# Patient Record
Sex: FEMALE | Race: WHITE | NOT HISPANIC OR LATINO | URBAN - METROPOLITAN AREA
[De-identification: names, ages, dates, MRNs, and addresses within clinical notes are randomized per-mention and may not be internally consistent; named-entity substitution may affect disease eponyms.]

---

## 2017-08-21 ENCOUNTER — OFFICE (OUTPATIENT)
Dept: URBAN - METROPOLITAN AREA CLINIC 11 | Facility: CLINIC | Age: 72
End: 2017-08-21

## 2017-08-21 ENCOUNTER — AMBULATORY SURGICAL CENTER (OUTPATIENT)
Dept: URBAN - METROPOLITAN AREA SURGERY 3 | Facility: SURGERY | Age: 72
End: 2017-08-21
Payer: MEDICARE

## 2017-08-21 ENCOUNTER — AMBULATORY SURGICAL CENTER (OUTPATIENT)
Dept: URBAN - METROPOLITAN AREA SURGERY 3 | Facility: SURGERY | Age: 72
End: 2017-08-21

## 2017-08-21 VITALS
HEART RATE: 58 BPM | DIASTOLIC BLOOD PRESSURE: 56 MMHG | RESPIRATION RATE: 18 BRPM | SYSTOLIC BLOOD PRESSURE: 147 MMHG | DIASTOLIC BLOOD PRESSURE: 59 MMHG | RESPIRATION RATE: 16 BRPM | OXYGEN SATURATION: 97 % | HEART RATE: 55 BPM | RESPIRATION RATE: 16 BRPM | OXYGEN SATURATION: 95 % | OXYGEN SATURATION: 94 % | DIASTOLIC BLOOD PRESSURE: 67 MMHG | TEMPERATURE: 97.8 F | SYSTOLIC BLOOD PRESSURE: 108 MMHG | WEIGHT: 182 LBS | HEART RATE: 56 BPM | WEIGHT: 182 LBS | DIASTOLIC BLOOD PRESSURE: 56 MMHG | SYSTOLIC BLOOD PRESSURE: 105 MMHG | TEMPERATURE: 96.4 F | OXYGEN SATURATION: 97 % | SYSTOLIC BLOOD PRESSURE: 100 MMHG | DIASTOLIC BLOOD PRESSURE: 67 MMHG | DIASTOLIC BLOOD PRESSURE: 60 MMHG | OXYGEN SATURATION: 96 % | SYSTOLIC BLOOD PRESSURE: 103 MMHG | RESPIRATION RATE: 18 BRPM | HEART RATE: 56 BPM | TEMPERATURE: 97.8 F | DIASTOLIC BLOOD PRESSURE: 58 MMHG | DIASTOLIC BLOOD PRESSURE: 59 MMHG | SYSTOLIC BLOOD PRESSURE: 108 MMHG | SYSTOLIC BLOOD PRESSURE: 100 MMHG | DIASTOLIC BLOOD PRESSURE: 60 MMHG | HEART RATE: 62 BPM | RESPIRATION RATE: 20 BRPM | SYSTOLIC BLOOD PRESSURE: 103 MMHG | HEART RATE: 62 BPM | SYSTOLIC BLOOD PRESSURE: 147 MMHG | TEMPERATURE: 96.4 F | OXYGEN SATURATION: 95 % | RESPIRATION RATE: 20 BRPM | HEART RATE: 55 BPM | HEIGHT: 63 IN | SYSTOLIC BLOOD PRESSURE: 105 MMHG | DIASTOLIC BLOOD PRESSURE: 58 MMHG | HEIGHT: 63 IN | OXYGEN SATURATION: 96 % | OXYGEN SATURATION: 94 % | HEART RATE: 58 BPM

## 2017-08-21 DIAGNOSIS — D12.3 BENIGN NEOPLASM OF TRANSVERSE COLON: ICD-10-CM

## 2017-08-21 DIAGNOSIS — K63.5 POLYP OF COLON: ICD-10-CM

## 2017-08-21 DIAGNOSIS — K64.8 OTHER HEMORRHOIDS: ICD-10-CM

## 2017-08-21 DIAGNOSIS — Z86.010 PERSONAL HISTORY OF COLONIC POLYPS: ICD-10-CM

## 2017-08-21 DIAGNOSIS — K57.30 DIVERTICULOSIS OF LARGE INTESTINE WITHOUT PERFORATION OR ABS: ICD-10-CM

## 2017-08-21 DIAGNOSIS — D12.0 BENIGN NEOPLASM OF CECUM: ICD-10-CM

## 2017-08-21 PROBLEM — D12.4 BENIGN NEOPLASM OF DESCENDING COLON: Status: ACTIVE | Noted: 2017-08-21

## 2017-08-21 PROCEDURE — 45385 COLONOSCOPY W/LESION REMOVAL: CPT | Mod: PT | Performed by: INTERNAL MEDICINE

## 2017-08-21 PROCEDURE — G8907 PT DOC NO EVENTS ON DISCHARG: HCPCS | Performed by: INTERNAL MEDICINE

## 2017-08-21 PROCEDURE — G8918 PT W/O PREOP ORDER IV AB PRO: HCPCS | Performed by: INTERNAL MEDICINE

## 2017-08-21 PROCEDURE — 88305 TISSUE EXAM BY PATHOLOGIST: CPT | Performed by: INTERNAL MEDICINE

## 2020-10-13 ENCOUNTER — AMBULATORY SURGICAL CENTER (OUTPATIENT)
Dept: URBAN - METROPOLITAN AREA SURGERY 3 | Facility: SURGERY | Age: 75
End: 2020-10-13
Payer: MEDICARE

## 2020-10-13 ENCOUNTER — OFFICE (OUTPATIENT)
Dept: URBAN - METROPOLITAN AREA PATHOLOGY 22 | Facility: PATHOLOGY | Age: 75
End: 2020-10-13
Payer: OTHER GOVERNMENT

## 2020-10-13 ENCOUNTER — AMBULATORY SURGICAL CENTER (OUTPATIENT)
Dept: URBAN - METROPOLITAN AREA SURGERY 3 | Facility: SURGERY | Age: 75
End: 2020-10-13
Payer: OTHER GOVERNMENT

## 2020-10-13 VITALS
SYSTOLIC BLOOD PRESSURE: 147 MMHG | SYSTOLIC BLOOD PRESSURE: 104 MMHG | TEMPERATURE: 98.3 F | DIASTOLIC BLOOD PRESSURE: 59 MMHG | HEART RATE: 76 BPM | OXYGEN SATURATION: 96 % | SYSTOLIC BLOOD PRESSURE: 103 MMHG | DIASTOLIC BLOOD PRESSURE: 52 MMHG | DIASTOLIC BLOOD PRESSURE: 59 MMHG | HEART RATE: 72 BPM | HEART RATE: 57 BPM | SYSTOLIC BLOOD PRESSURE: 103 MMHG | WEIGHT: 183 LBS | HEIGHT: 62 IN | SYSTOLIC BLOOD PRESSURE: 103 MMHG | RESPIRATION RATE: 20 BRPM | SYSTOLIC BLOOD PRESSURE: 147 MMHG | RESPIRATION RATE: 18 BRPM | DIASTOLIC BLOOD PRESSURE: 51 MMHG | OXYGEN SATURATION: 97 % | SYSTOLIC BLOOD PRESSURE: 112 MMHG | TEMPERATURE: 98 F | HEIGHT: 62 IN | SYSTOLIC BLOOD PRESSURE: 147 MMHG | RESPIRATION RATE: 20 BRPM | DIASTOLIC BLOOD PRESSURE: 51 MMHG | DIASTOLIC BLOOD PRESSURE: 48 MMHG | SYSTOLIC BLOOD PRESSURE: 112 MMHG | TEMPERATURE: 98 F | SYSTOLIC BLOOD PRESSURE: 104 MMHG | HEART RATE: 70 BPM | SYSTOLIC BLOOD PRESSURE: 112 MMHG | HEART RATE: 57 BPM | HEART RATE: 70 BPM | OXYGEN SATURATION: 97 % | OXYGEN SATURATION: 95 % | SYSTOLIC BLOOD PRESSURE: 111 MMHG | HEART RATE: 76 BPM | SYSTOLIC BLOOD PRESSURE: 104 MMHG | HEART RATE: 70 BPM | HEART RATE: 76 BPM | TEMPERATURE: 98 F | TEMPERATURE: 98.3 F | DIASTOLIC BLOOD PRESSURE: 51 MMHG | DIASTOLIC BLOOD PRESSURE: 48 MMHG | HEART RATE: 68 BPM | OXYGEN SATURATION: 96 % | OXYGEN SATURATION: 95 % | TEMPERATURE: 98.3 F | HEART RATE: 57 BPM | OXYGEN SATURATION: 97 % | SYSTOLIC BLOOD PRESSURE: 111 MMHG | OXYGEN SATURATION: 96 % | DIASTOLIC BLOOD PRESSURE: 48 MMHG | DIASTOLIC BLOOD PRESSURE: 59 MMHG | SYSTOLIC BLOOD PRESSURE: 111 MMHG | DIASTOLIC BLOOD PRESSURE: 52 MMHG | HEART RATE: 72 BPM | RESPIRATION RATE: 18 BRPM | OXYGEN SATURATION: 95 % | WEIGHT: 183 LBS | DIASTOLIC BLOOD PRESSURE: 52 MMHG | WEIGHT: 183 LBS | HEIGHT: 62 IN | RESPIRATION RATE: 20 BRPM | HEART RATE: 68 BPM | HEART RATE: 68 BPM | HEART RATE: 72 BPM | RESPIRATION RATE: 18 BRPM

## 2020-10-13 DIAGNOSIS — D12.2 BENIGN NEOPLASM OF ASCENDING COLON: ICD-10-CM

## 2020-10-13 DIAGNOSIS — K57.30 DIVERTICULOSIS OF LARGE INTESTINE WITHOUT PERFORATION OR ABS: ICD-10-CM

## 2020-10-13 DIAGNOSIS — Z86.010 PERSONAL HISTORY OF COLONIC POLYPS: ICD-10-CM

## 2020-10-13 PROBLEM — K63.5 POLYP OF COLON: Status: ACTIVE | Noted: 2020-10-13

## 2020-10-13 PROCEDURE — 45380 COLONOSCOPY AND BIOPSY: CPT | Mod: PT | Performed by: INTERNAL MEDICINE

## 2020-10-13 PROCEDURE — G8907 PT DOC NO EVENTS ON DISCHARG: HCPCS | Performed by: INTERNAL MEDICINE

## 2020-10-13 PROCEDURE — 88305 TISSUE EXAM BY PATHOLOGIST: CPT | Performed by: INTERNAL MEDICINE

## 2020-10-13 PROCEDURE — G8918 PT W/O PREOP ORDER IV AB PRO: HCPCS | Performed by: INTERNAL MEDICINE

## 2020-10-13 NOTE — SERVICEHPINOTES
75 year old female followed by Dr. Urias in the past with multiple adenomas on her last exam.  The patient had breast cancer 2 years ago and is in remission.  No family history of colon cancer or polyps that places her at higher risk.

## 2025-01-28 ENCOUNTER — AMBULATORY SURGICAL CENTER (OUTPATIENT)
Dept: URBAN - METROPOLITAN AREA SURGERY 3 | Facility: SURGERY | Age: 80
End: 2025-01-28
Payer: MEDICARE

## 2025-01-28 VITALS
HEART RATE: 61 BPM | DIASTOLIC BLOOD PRESSURE: 63 MMHG | RESPIRATION RATE: 14 BRPM | RESPIRATION RATE: 14 BRPM | DIASTOLIC BLOOD PRESSURE: 39 MMHG | SYSTOLIC BLOOD PRESSURE: 120 MMHG | OXYGEN SATURATION: 93 % | SYSTOLIC BLOOD PRESSURE: 111 MMHG | HEIGHT: 62 IN | TEMPERATURE: 97.8 F | OXYGEN SATURATION: 96 % | HEART RATE: 60 BPM | OXYGEN SATURATION: 93 % | DIASTOLIC BLOOD PRESSURE: 39 MMHG | RESPIRATION RATE: 10 BRPM | OXYGEN SATURATION: 95 % | HEART RATE: 63 BPM | OXYGEN SATURATION: 95 % | SYSTOLIC BLOOD PRESSURE: 111 MMHG | TEMPERATURE: 97.8 F | SYSTOLIC BLOOD PRESSURE: 106 MMHG | HEART RATE: 60 BPM | DIASTOLIC BLOOD PRESSURE: 51 MMHG | SYSTOLIC BLOOD PRESSURE: 106 MMHG | SYSTOLIC BLOOD PRESSURE: 152 MMHG | HEART RATE: 61 BPM | WEIGHT: 179 LBS | SYSTOLIC BLOOD PRESSURE: 127 MMHG | RESPIRATION RATE: 12 BRPM | DIASTOLIC BLOOD PRESSURE: 55 MMHG | SYSTOLIC BLOOD PRESSURE: 120 MMHG | HEIGHT: 62 IN | OXYGEN SATURATION: 99 % | OXYGEN SATURATION: 99 % | RESPIRATION RATE: 10 BRPM | RESPIRATION RATE: 15 BRPM | DIASTOLIC BLOOD PRESSURE: 49 MMHG | TEMPERATURE: 97.6 F | DIASTOLIC BLOOD PRESSURE: 55 MMHG | DIASTOLIC BLOOD PRESSURE: 51 MMHG | SYSTOLIC BLOOD PRESSURE: 152 MMHG | WEIGHT: 179 LBS | SYSTOLIC BLOOD PRESSURE: 127 MMHG | DIASTOLIC BLOOD PRESSURE: 63 MMHG | DIASTOLIC BLOOD PRESSURE: 49 MMHG | RESPIRATION RATE: 12 BRPM | OXYGEN SATURATION: 96 % | HEART RATE: 63 BPM | TEMPERATURE: 97.6 F | RESPIRATION RATE: 15 BRPM

## 2025-01-28 DIAGNOSIS — K55.20 ANGIODYSPLASIA OF COLON WITHOUT HEMORRHAGE: ICD-10-CM

## 2025-01-28 DIAGNOSIS — K57.30 DIVERTICULOSIS OF LARGE INTESTINE WITHOUT PERFORATION OR ABS: ICD-10-CM

## 2025-01-28 DIAGNOSIS — K52.832 LYMPHOCYTIC COLITIS: ICD-10-CM

## 2025-01-28 DIAGNOSIS — Z86.0101 PERSONAL HISTORY OF ADENOMATOUS AND SERRATED COLON POLYPS: ICD-10-CM

## 2025-01-28 PROBLEM — K52.89 CLINICALLY SIGNIFICANT DIARRHEA OF UNEXPLAINED ORIGIN: Status: ACTIVE | Noted: 2025-01-28

## 2025-01-28 PROCEDURE — 45380 COLONOSCOPY AND BIOPSY: CPT | Performed by: INTERNAL MEDICINE

## 2025-01-29 LAB
GASTRO ONE PATHOLOGY: PDF REPORT: (no result)
GASTRO ONE PATHOLOGY: PDF REPORT: (no result)

## 2025-02-27 ENCOUNTER — OFFICE (OUTPATIENT)
Dept: URBAN - METROPOLITAN AREA CLINIC 11 | Facility: CLINIC | Age: 80
End: 2025-02-27
Payer: MEDICARE

## 2025-02-27 VITALS
SYSTOLIC BLOOD PRESSURE: 162 MMHG | SYSTOLIC BLOOD PRESSURE: 149 MMHG | DIASTOLIC BLOOD PRESSURE: 58 MMHG | OXYGEN SATURATION: 94 % | HEIGHT: 62 IN | WEIGHT: 185 LBS | HEART RATE: 64 BPM

## 2025-02-27 DIAGNOSIS — R35.89 OTHER POLYURIA: ICD-10-CM

## 2025-02-27 DIAGNOSIS — R19.7 DIARRHEA, UNSPECIFIED: ICD-10-CM

## 2025-02-27 PROCEDURE — 99213 OFFICE O/P EST LOW 20 MIN: CPT | Performed by: INTERNAL MEDICINE

## 2025-02-28 LAB
MICROSCOPIC EXAMINATION: BACTERIA: (no result)
MICROSCOPIC EXAMINATION: CAST TYPE: (no result)
MICROSCOPIC EXAMINATION: CASTS: (no result) /LPF
MICROSCOPIC EXAMINATION: COMMENT: (no result)
MICROSCOPIC EXAMINATION: CRYSTAL TYPE: (no result)
MICROSCOPIC EXAMINATION: CRYSTALS: (no result)
MICROSCOPIC EXAMINATION: EPITHELIAL CELLS (NON RENAL): (no result) /HPF
MICROSCOPIC EXAMINATION: EPITHELIAL CELLS (RENAL): (no result)
MICROSCOPIC EXAMINATION: MUCUS THREADS: (no result)
MICROSCOPIC EXAMINATION: RBC: (no result) /HPF
MICROSCOPIC EXAMINATION: TRICHOMONAS: (no result)
MICROSCOPIC EXAMINATION: WBC: (no result) /HPF
MICROSCOPIC EXAMINATION: YEAST: (no result)
UA/M W/RFLX CULTURE, ROUTINE: APPEARANCE: CLEAR
UA/M W/RFLX CULTURE, ROUTINE: BILIRUBIN: NEGATIVE
UA/M W/RFLX CULTURE, ROUTINE: GLUCOSE: NEGATIVE
UA/M W/RFLX CULTURE, ROUTINE: KETONES: NEGATIVE
UA/M W/RFLX CULTURE, ROUTINE: MICROSCOPIC EXAMINATION: (no result)
UA/M W/RFLX CULTURE, ROUTINE: MICROSCOPIC EXAMINATION: (no result)
UA/M W/RFLX CULTURE, ROUTINE: NITRITE, URINE: NEGATIVE
UA/M W/RFLX CULTURE, ROUTINE: OCCULT BLOOD: NEGATIVE
UA/M W/RFLX CULTURE, ROUTINE: PH: 7 (ref 5–7.5)
UA/M W/RFLX CULTURE, ROUTINE: PROTEIN: NEGATIVE
UA/M W/RFLX CULTURE, ROUTINE: SPECIFIC GRAVITY: 1.01 (ref 1–1.03)
UA/M W/RFLX CULTURE, ROUTINE: URINALYSIS REFLEX: (no result)
UA/M W/RFLX CULTURE, ROUTINE: URINE-COLOR: YELLOW
UA/M W/RFLX CULTURE, ROUTINE: UROBILINOGEN,SEMI-QN: 0.2 MG/DL (ref 0.2–1)
UA/M W/RFLX CULTURE, ROUTINE: WBC ESTERASE: NEGATIVE